# Patient Record
(demographics unavailable — no encounter records)

---

## 2024-10-11 NOTE — PHYSICAL EXAM
[Normal] : supple, no neck mass and thyroid not enlarged [Normal] : no peripheral adenopathy appreciated [Normal Neck Lymph Nodes] : normal neck lymph nodes  [Normal Supraclavicular Lymph Nodes] : normal supraclavicular lymph nodes [Normal Groin Lymph Nodes] : normal groin lymph nodes [Normal Axillary Lymph Nodes] : normal axillary lymph nodes [de-identified] : Below [de-identified] : Below

## 2024-10-11 NOTE — HISTORY OF PRESENT ILLNESS
[de-identified] : 49-year-old man.  DFSP follow-up.  CC: No complaints related to the surgical site on the left buttock/hip area.  + A recurrence of his sinusitis symptoms. Previously he saw ENT and was prescribed amoxicillin and steroids, with benefit. At his request, I renewed the amoxicillin prescription today.  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  This was an asymptomatic lump that he noticed towards the end of 2021. No preceding trauma or strain. Persistence led to dermatologic evaluation, biopsy, and the above diagnosis.   Sept 2022: Imaging at [facility]: CXR: OK US left buttock: No susp. findings  9/30/23: W.E. Left buttock DFSP. Plastics: Dr Yemi NUGENT. Surg. Path.: + residual DFSP + bx scar NEG margins.   No prior personal history of malignancy.   Family history of malignancy: Father: Liver cancer.   Derm: [facility]. CHARLIE Ernst. September 2023 visit: Biopsy.  Right shoulder was benign. I suggested a follow-up visit.   PMD: Kaylah GANN NP.  NKDA.  No pacemaker or defibrillator. No anticoagulants.  No current prescription medications.  March 2024, he had an episode of sinusitis. Seen by ENT (Dr. Jonny VICENTE). Successfully treated with steroids and oral antibiotics.   Colonoscopy August 2024 at [facility], [place] x 1 year. He had 9 benign polyps.

## 2024-10-11 NOTE — ASSESSMENT
[FreeTextEntry1] : 49-year-old man.  September 2022: Resection of left buttock DFSP.  It has now been >2 years since his surgery.  He is asymptomatic with a normal physical examination.  I have offered to see him annually, sooner if needed.  Encouraged regular follow-up with dermatology.  Reviewed in detail, all questions answered.

## 2024-10-11 NOTE — REASON FOR VISIT
[Follow-Up Visit] : a follow-up visit for [Other: _____] : [unfilled] [FreeTextEntry2] :  left buttock DFSP resected September 2022

## 2024-10-11 NOTE — HISTORY OF PRESENT ILLNESS
[de-identified] : 49-year-old man.  DFSP follow-up.  CC: No complaints related to the surgical site on the left buttock/hip area.  + A recurrence of his sinusitis symptoms. Previously he saw ENT and was prescribed amoxicillin and steroids, with benefit. At his request, I renewed the amoxicillin prescription today.  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  This was an asymptomatic lump that he noticed towards the end of 2021. No preceding trauma or strain. Persistence led to dermatologic evaluation, biopsy, and the above diagnosis.   Sept 2022: Imaging at [facility]: CXR: OK US left buttock: No susp. findings  9/30/23: W.E. Left buttock DFSP. Plastics: Dr Yemi NUGENT. Surg. Path.: + residual DFSP + bx scar NEG margins.   No prior personal history of malignancy.   Family history of malignancy: Father: Liver cancer.   Derm: [facility]. CHARLIE Ernst. September 2023 visit: Biopsy.  Right shoulder was benign. I suggested a follow-up visit.   PMD: Kaylah GANN NP.  NKDA.  No pacemaker or defibrillator. No anticoagulants.  No current prescription medications.  March 2024, he had an episode of sinusitis. Seen by ENT (Dr. Jonny VICENTE). Successfully treated with steroids and oral antibiotics.   Colonoscopy August 2024 at [facility], [place] x 1 year. He had 9 benign polyps.

## 2024-10-11 NOTE — PHYSICAL EXAM
[Normal] : supple, no neck mass and thyroid not enlarged [Normal] : no peripheral adenopathy appreciated [Normal Neck Lymph Nodes] : normal neck lymph nodes  [Normal Supraclavicular Lymph Nodes] : normal supraclavicular lymph nodes [Normal Groin Lymph Nodes] : normal groin lymph nodes [Normal Axillary Lymph Nodes] : normal axillary lymph nodes [de-identified] : Below [de-identified] : Below

## 2024-10-11 NOTE — REVIEW OF SYSTEMS
[Negative] : Endocrine [FreeTextEntry4] :   History of sinusitis [FreeTextEntry7] :  NKDA [FreeTextEntry1] :  DFSP